# Patient Record
Sex: MALE | Race: WHITE | Employment: FULL TIME | URBAN - METROPOLITAN AREA
[De-identification: names, ages, dates, MRNs, and addresses within clinical notes are randomized per-mention and may not be internally consistent; named-entity substitution may affect disease eponyms.]

---

## 2017-01-19 ENCOUNTER — OFFICE VISIT (OUTPATIENT)
Dept: URGENT CARE | Facility: RETAIL CLINIC | Age: 42
End: 2017-01-19

## 2017-01-19 VITALS
TEMPERATURE: 98.1 F | RESPIRATION RATE: 20 BRPM | BODY MASS INDEX: 26.46 KG/M2 | SYSTOLIC BLOOD PRESSURE: 122 MMHG | WEIGHT: 169 LBS | OXYGEN SATURATION: 99 % | HEART RATE: 76 BPM | DIASTOLIC BLOOD PRESSURE: 78 MMHG

## 2017-01-19 DIAGNOSIS — J00 COMMON COLD: ICD-10-CM

## 2017-01-19 DIAGNOSIS — J02.9 ACUTE PHARYNGITIS, UNSPECIFIED ETIOLOGY: Primary | ICD-10-CM

## 2017-01-19 LAB — S PYO AG THROAT QL IA.RAPID: NORMAL

## 2017-01-19 PROCEDURE — 87880 STREP A ASSAY W/OPTIC: CPT | Mod: QW | Performed by: NURSE PRACTITIONER

## 2017-01-19 PROCEDURE — 99202 OFFICE O/P NEW SF 15 MIN: CPT | Performed by: NURSE PRACTITIONER

## 2017-01-19 PROCEDURE — 87081 CULTURE SCREEN ONLY: CPT | Performed by: NURSE PRACTITIONER

## 2017-01-19 NOTE — MR AVS SNAPSHOT
"              After Visit Summary   2017    Slade Foster Sr.    MRN: [id number]           Patient Information     Date Of Birth          1975        Visit Information        Provider Department      2017 3:50 PM Robin Gutierrez APRN [facility]        Today's Diagnoses     Acute pharyngitis, unspecified etiology    -  1     Common cold            Follow-ups after your visit        Who to contact     You can reach your care team any time of the day by calling [phone].  Notification of test results:  If you have an abnormal lab result, we will notify you by phone as soon as possible.         Additional Information About Your Visit        MyChart Information     [employer] lets you send messages to your doctor, view your test results, renew your prescriptions, schedule appointments and more. To sign up, go to [email or web address] . Click on \"Log in\" on the left side of the screen, which will take you to the Welcome page. Then click on \"Sign up Now\" on the right side of the page.     You will be asked to enter the access code listed below, as well as some personal information. Please follow the directions to create your username and password.     Your access code is: [email or web address]  Expires: 2017  4:39 PM     Your access code will  in 90 days. If you need help or a new code, please call your [place] clinic or [phone].        Care EveryWhere ID     This is your [facility] EveryWhere ID. This could be used by other organizations to access your [place] medical records  [id number]        Your Vitals Were     Pulse Temperature Respirations Pulse Oximetry          76 98.1  F (36.7  C) (Oral) 20 99%         Blood Pressure from Last 3 Encounters:   17 122/78   16 139/79   16 140/98    Weight from Last 3 Encounters:   17 169 lb (76.658 kg)   16 160 lb (72.576 kg)   16 163 lb (73.936 kg)              We Performed the Following     " BETA STREP GROUP A R/O CULTURE     RAPID STREP SCREEN        Primary Care Provider Fax #    [facility] [phone] [address] [facility] [address]        Thank you!     Thank you for choosing [facility]  for your care. Our goal is always to provide you with excellent care. Hearing back from our patients is one way we can continue to improve our services. Please take a few minutes to complete the written survey that you may receive in the mail after your visit with us. Thank you!             Your Updated Medication List - Protect others around you: Learn how to safely use, store and throw away your medicines at www.disposemymeds.org.          This list is accurate as of: 1/19/17  4:39 PM.  Always use your most recent med list.                   Brand Name Dispense Instructions for use    IBUPROFEN PO      Take 800 mg by mouth every 6 hours as needed for moderate pain

## 2017-01-19 NOTE — PROGRESS NOTES
[facility] clinic note    SUBJECTIVE:  Slade Foster Sr. is a 41 year old male who presents to [facility] with chief complaint of sore throat.    Onset of symptoms was 4 day(s) ago.    Course of illness: sudden onset and worsening.    Severity moderate and severe  Course of illness:  Current and Associated symptoms: Sweats & chills, nasal congestion, rhinorrhea, cough, sore throat, myalgias and malaise  Treatment measures tried at home include Fluids, OTC meds and Rest.  Predisposing factors include None.    Current Outpatient Prescriptions   Medication     IBUPROFEN PO     No current facility-administered medications for this visit.     PAST MEDICAL HISTORY: No past medical history on file.    PAST SURGICAL HISTORY:   Past Surgical History   Procedure Laterality Date     Bx/remv,lymph node,deep axill  1978     left axilla, benign       FAMILY HISTORY:   Family History   Problem Relation Age of Onset     Circulatory Paternal Grandfather      possible carotid disease/vertebral basilar syndrome       SOCIAL HISTORY:   Social History   Substance Use Topics     Smoking status: Never Smoker      Smokeless tobacco: Never Used     Alcohol Use: No       ROS:  Review of systems negative except as stated above.    OBJECTIVE:   Filed Vitals:    01/19/17 1618   BP: 122/78   Pulse: 76   Temp: 98.1  F (36.7  C)   TempSrc: Oral   Resp: 20   Weight: 169 lb (76.658 kg)   SpO2: 99%     GENERAL APPEARANCE: alert, moderate distress and cooperative  HENT: ear canals and TM's normal.  Nose normal.  Pharynx erythematous noted.  NECK: bilateral anterior cervical adenopathy  RESP: lungs clear to auscultation - no rales, rhonchi or wheezes  CV: regular rates and rhythm, normal S1 S2, no murmur noted  ABDOMEN:  soft, nontender, no HSM or masses and bowel sounds normal  SKIN: no suspicious lesions or rashes  NEURO: Normal strength and tone, sensory exam grossly normal,  normal speech and  mentation    Rapid Strep test is negative; await throat culture results.    ASSESSMENT:  Sore Throat J02.9/Acute pharyngitis, unspecified etiology [J02.9]  Common cold [J00]    PLAN:       If not improving Follow up at:  [facility] [phone]  Encourage good hydration (mainly water), may drink tea /c honey, warm chicken broth to sooth throat.  Soft foods may be preferred for several days.  Symptomatic treatment with warm Na+ H2O gargles, OTC analgesic, etc. discussed.   Strep culture pending.   Slade Fostre Sr. told positive cultures called only.  Rest as needed.  Follow-up with primary care provider if not improving.    If difficulty breathing or swallowing be seen immediately in the ED.    Robin CASAS, MSN, Family NP-C  [facility]

## 2017-01-22 LAB — BETA STREP CONFIRM: NORMAL
